# Patient Record
Sex: FEMALE | Employment: FULL TIME | URBAN - METROPOLITAN AREA
[De-identification: names, ages, dates, MRNs, and addresses within clinical notes are randomized per-mention and may not be internally consistent; named-entity substitution may affect disease eponyms.]

---

## 2020-03-04 ENCOUNTER — OFFICE VISIT (OUTPATIENT)
Dept: NEUROLOGY | Age: 30
End: 2020-03-04

## 2020-03-04 VITALS
HEIGHT: 61 IN | DIASTOLIC BLOOD PRESSURE: 76 MMHG | BODY MASS INDEX: 33.99 KG/M2 | SYSTOLIC BLOOD PRESSURE: 122 MMHG | WEIGHT: 180 LBS

## 2020-03-04 DIAGNOSIS — R51.9 BILATERAL HEADACHES: Primary | ICD-10-CM

## 2020-03-04 DIAGNOSIS — G43.009 MIGRAINE WITHOUT AURA AND WITHOUT STATUS MIGRAINOSUS, NOT INTRACTABLE: ICD-10-CM

## 2020-03-04 DIAGNOSIS — M54.2 NECK PAIN: ICD-10-CM

## 2020-03-04 RX ORDER — CYCLOBENZAPRINE HCL 10 MG
10 TABLET ORAL
Qty: 30 TAB | Refills: 0 | Status: SHIPPED | OUTPATIENT
Start: 2020-03-04 | End: 2020-03-31

## 2020-03-04 RX ORDER — METHYLPREDNISOLONE 4 MG/1
TABLET ORAL
Qty: 1 DOSE PACK | Refills: 0 | Status: SHIPPED | OUTPATIENT
Start: 2020-03-04

## 2020-03-04 NOTE — PROGRESS NOTES
NEUROLOGY HISTORY AND PHYSICAL    Name Abril Schofield Age 34 y.o. MRN <[id number]>  1990         Chief Complaint:  headaches     This is a pleasant 34 y.o. Right handed female with a medical history of neck pain. She comes with a complaint of neck for the past two weeks. She is has tried using stretching but it did not work. She had a similar episode when was pregnant that was resolved with physical therapy. No visual loss. Assessment and Plan  1. Headaches  Appears to have 3 types of headaches  Episodic migraines which appear to be infrequent. She has common headaches  She also has prolonged occipital headaches  She had some success with the Cambia that I gave her in the office   will prescribe a Medrol Dosepak and Flexeril to be used until headache free for 24 hours. 2. Neck pain  Prescribed cyclobenzaprine    3. Migraine  Controlled  infrequent    No Known Allergies        Current Outpatient Medications   Medication Sig    norethindrone-e.estradiol-iron (BLISOVI 24 FE PO) Take  by mouth. No current facility-administered medications for this visit. History reviewed. No pertinent past medical history. Social History     Tobacco Use    Smoking status: Never Smoker    Smokeless tobacco: Never Used   Substance Use Topics    Alcohol use: Not Currently    Drug use: Not on file        Review of Systems   Constitutional: Negative for chills and fever. HENT: Negative for ear pain. Eyes: Negative for pain and discharge. Respiratory: Negative for cough and hemoptysis. Cardiovascular: Negative for chest pain and claudication. Gastrointestinal: Positive for nausea. Negative for constipation and diarrhea. Genitourinary: Negative for flank pain and hematuria. Musculoskeletal: Positive for myalgias and neck pain. Negative for back pain. Skin: Negative for itching and rash. Neurological: Positive for headaches.    Endo/Heme/Allergies: Negative for environmental allergies. Does not bruise/bleed easily. Psychiatric/Behavioral: Negative for depression and hallucinations. The patient is nervous/anxious. Exam  Visit Vitals  /76   Ht 5' 1\" (1.549 m)   Wt 180 lb (81.6 kg)   BMI 34.01 kg/m²         General: Well developed, well nourished. Patient in pain   Head: Normocephalic, atraumatic, anicteric sclera   Neck Normal ROM, No thyromegally  Neck pain limited rom   Lungs:  Clear to auscultation bilaterally,   Cardiac: Regular rate and rhythm with no murmurs. Abd: Bowel sounds were audible. Ext: No pedal edema   Skin: Supple no rash     NeurologicExam:  Mental Status: Alert and oriented to person place and time   Speech: Fluent no aphasia or dysarthria. Cranial Nerves:   II-XII Intact   Undialated exam of the optic discs revealed sharp discs with no hemorrhage or exudate. Motor:  Full and symmetric strength of upper and lower ext . Normal bulk and tone. Significant neck tension especially affecting the muscles of the upper back   Reflexes:   Deep tendon reflexes 2+/4 and symmetric. Sensory:   Symmetric and intact    Gait:  Gait is balanced    Tremor:   No tremor noted. Cerebellar:  Coordination intact. Neurovascular: No carotid bruits.  No JVD

## 2020-03-31 DIAGNOSIS — M54.2 NECK PAIN: ICD-10-CM

## 2020-03-31 DIAGNOSIS — R51.9 BILATERAL HEADACHES: ICD-10-CM

## 2020-03-31 RX ORDER — CYCLOBENZAPRINE HCL 10 MG
TABLET ORAL
Qty: 30 TAB | Refills: 3 | Status: SHIPPED | OUTPATIENT
Start: 2020-03-31

## 2023-05-11 RX ORDER — CYCLOBENZAPRINE HCL 10 MG
1 TABLET ORAL NIGHTLY
COMMUNITY
Start: 2020-03-31

## 2023-05-11 RX ORDER — METHYLPREDNISOLONE 4 MG/1
TABLET ORAL
COMMUNITY
Start: 2020-03-04

## 2023-05-11 RX ORDER — IBUPROFEN 800 MG/1
TABLET ORAL EVERY 8 HOURS
COMMUNITY
Start: 2015-02-04

## 2023-05-11 RX ORDER — OXYCODONE HYDROCHLORIDE AND ACETAMINOPHEN 5; 325 MG/1; MG/1
2 TABLET ORAL EVERY 6 HOURS PRN
COMMUNITY
Start: 2015-02-04

## 2023-05-11 RX ORDER — DICLOFENAC POTASSIUM 50 MG/1
POWDER, FOR SOLUTION ORAL
COMMUNITY
Start: 2020-03-04